# Patient Record
Sex: MALE | Race: BLACK OR AFRICAN AMERICAN | NOT HISPANIC OR LATINO | URBAN - METROPOLITAN AREA
[De-identification: names, ages, dates, MRNs, and addresses within clinical notes are randomized per-mention and may not be internally consistent; named-entity substitution may affect disease eponyms.]

---

## 2020-10-22 ENCOUNTER — OFFICE VISIT (OUTPATIENT)
Dept: URBAN - METROPOLITAN AREA CLINIC 78 | Facility: CLINIC | Age: 35
End: 2020-10-22
Payer: COMMERCIAL

## 2020-10-22 ENCOUNTER — DASHBOARD ENCOUNTERS (OUTPATIENT)
Age: 35
End: 2020-10-22

## 2020-10-22 DIAGNOSIS — R10.13 EPIGASTRIC PAIN: ICD-10-CM

## 2020-10-22 DIAGNOSIS — A04.8 HELICOBACTER PYLORI (H. PYLORI): ICD-10-CM

## 2020-10-22 DIAGNOSIS — R52 TENDERNESS DETERMINED BY EXAMINATION: ICD-10-CM

## 2020-10-22 DIAGNOSIS — Z79.1 NSAID LONG-TERM USE: ICD-10-CM

## 2020-10-22 DIAGNOSIS — R11.11 VOMITING WITHOUT NAUSEA: ICD-10-CM

## 2020-10-22 PROCEDURE — G8483 FLU IMM NO ADMIN DOC REA: HCPCS | Performed by: INTERNAL MEDICINE

## 2020-10-22 PROCEDURE — 99244 OFF/OP CNSLTJ NEW/EST MOD 40: CPT | Performed by: INTERNAL MEDICINE

## 2020-10-22 NOTE — HPI-TODAY'S VISIT:
The patient was referred by Dr. Cj Gandhi for abdominal pain /nausea and Vomiting .   A copy of this document is being forwarded to the referring provider.

## 2020-10-22 NOTE — PHYSICAL EXAM GASTROINTESTINAL
Abdomen , soft, tenderness in epigastric area , and b/l lower abdomen  non-distended , no guarding or rigidity , no masses palpable , normal bowel sounds Liver and Spleen , no hepatomegaly present , liver non-tender , spleen not palpable

## 2020-10-22 NOTE — HPI-TODAY'S VISIT:
The patient was referred by Dr. Cj Gandhi for abdominal pain /nausea and Vomiting .   A copy of this document is being forwarded to the referring provider. Patient states that he woke  with upset stomach  as few days back and started with vomiting  Vomiting helped relieve his acute symptoms but symptoms persisted as burning sensation He also reports sharp pain and extreme tiredness  He waited it out a day . No associated fever or chills  Patient saw his PCP yesterday   UBT done he was started on tripple therapy  for H. pylori   This is his third visit in past year  for similar complains to his PCP , this episodes is lasting longer    Takes excedrin for HA  ( 8 pills a month)   Drinks .. couple of beers on weekends  During week whiskey 1-2 x 5 days    Occasional smoke cig   Smokes Marijuana on weekends  x 1 years   Baseline BM are all over place  Occasional diarrhea 3 x months  Deneis weight loss or blood in stool

## 2020-10-22 NOTE — PHYSICAL EXAM CHEST:
Sternal tip tenderness ,no significant scars are present,  chest wall non-tender except sternal tip breathing is un-labored without accessory muscle use,normal breath sounds

## 2020-10-23 ENCOUNTER — OFFICE VISIT (OUTPATIENT)
Dept: URBAN - METROPOLITAN AREA SURGERY CENTER 15 | Facility: SURGERY CENTER | Age: 35
End: 2020-10-23
Payer: COMMERCIAL

## 2020-10-23 ENCOUNTER — CLAIMS CREATED FROM THE CLAIM WINDOW (OUTPATIENT)
Dept: URBAN - METROPOLITAN AREA CLINIC 4 | Facility: CLINIC | Age: 35
End: 2020-10-23
Payer: COMMERCIAL

## 2020-10-23 DIAGNOSIS — K21.0 GASTRO-ESOPHAGEAL REFLUX DISEASE WITH ESOPHAGITIS: ICD-10-CM

## 2020-10-23 DIAGNOSIS — K22.8 COLUMNAR-LINED ESOPHAGUS: ICD-10-CM

## 2020-10-23 DIAGNOSIS — K29.60 OTHER GASTRITIS WITHOUT BLEEDING: ICD-10-CM

## 2020-10-23 DIAGNOSIS — K21.9 ACID REFLUX: ICD-10-CM

## 2020-10-23 DIAGNOSIS — K31.89 OTHER DISEASES OF STOMACH AND DUODENUM: ICD-10-CM

## 2020-10-23 DIAGNOSIS — K21.9 GASTRO-ESOPHAGEAL REFLUX DISEASE WITHOUT ESOPHAGITIS: ICD-10-CM

## 2020-10-23 DIAGNOSIS — K29.60 ADENOPAPILLOMATOSIS GASTRICA: ICD-10-CM

## 2020-10-23 PROCEDURE — 88312 SPECIAL STAINS GROUP 1: CPT | Performed by: PATHOLOGY

## 2020-10-23 PROCEDURE — G8907 PT DOC NO EVENTS ON DISCHARG: HCPCS | Performed by: INTERNAL MEDICINE

## 2020-10-23 PROCEDURE — 88305 TISSUE EXAM BY PATHOLOGIST: CPT | Performed by: PATHOLOGY

## 2020-10-23 PROCEDURE — 88342 IMHCHEM/IMCYTCHM 1ST ANTB: CPT | Performed by: PATHOLOGY

## 2020-10-23 PROCEDURE — 43239 EGD BIOPSY SINGLE/MULTIPLE: CPT | Performed by: INTERNAL MEDICINE
